# Patient Record
Sex: MALE | Race: WHITE | Employment: FULL TIME | URBAN - METROPOLITAN AREA
[De-identification: names, ages, dates, MRNs, and addresses within clinical notes are randomized per-mention and may not be internally consistent; named-entity substitution may affect disease eponyms.]

---

## 2018-02-08 ENCOUNTER — HOSPITAL ENCOUNTER (OUTPATIENT)
Dept: OTHER | Age: 43
Discharge: HOME | End: 2018-02-08
Attending: PREVENTIVE MEDICINE | Admitting: PREVENTIVE MEDICINE

## 2018-02-09 LAB
MUV IGG SER QL: 67.1
RUBELLA ANTIBODY IGG: 93
RUBEOLA (MEASLES) AB IGG: 7.4
VARICELLA-ZOSTER VIRUS AB, IGG: >4000

## 2018-02-12 LAB
NIL (NEGATIVE) SPOT CONTROL: 0
PANEL A SPOT COUNT: 0
PANEL B SPOT COUNT: 0
POSITIVE CONTROL SPOT COUNT: >20
TB CELL IMMUNE MEASURE: NEGATIVE

## 2018-07-05 ENCOUNTER — OFFICE VISIT (OUTPATIENT)
Dept: FAMILY MEDICINE CLINIC | Age: 43
End: 2018-07-05

## 2018-07-05 ENCOUNTER — HOSPITAL ENCOUNTER (OUTPATIENT)
Dept: GENERAL RADIOLOGY | Age: 43
Discharge: HOME | End: 2018-07-31
Attending: NURSE PRACTITIONER | Admitting: NURSE PRACTITIONER

## 2018-07-05 VITALS
WEIGHT: 183.4 LBS | SYSTOLIC BLOOD PRESSURE: 118 MMHG | HEIGHT: 69 IN | BODY MASS INDEX: 27.16 KG/M2 | HEART RATE: 75 BPM | DIASTOLIC BLOOD PRESSURE: 76 MMHG | OXYGEN SATURATION: 97 %

## 2018-07-05 DIAGNOSIS — S99.912A INJURY OF LEFT ANKLE, INITIAL ENCOUNTER: Primary | ICD-10-CM

## 2018-07-05 DIAGNOSIS — M25.572 ACUTE LEFT ANKLE PAIN: Primary | ICD-10-CM

## 2018-07-05 DIAGNOSIS — S99.912A INJURY OF LEFT ANKLE, INITIAL ENCOUNTER: ICD-10-CM

## 2018-07-05 PROCEDURE — 99202 OFFICE O/P NEW SF 15 MIN: CPT | Performed by: NURSE PRACTITIONER

## 2018-07-05 RX ORDER — NAPROXEN 500 MG/1
500 TABLET ORAL 2 TIMES DAILY WITH MEALS
Qty: 60 TABLET | Refills: 0 | Status: SHIPPED | OUTPATIENT
Start: 2018-07-05

## 2018-07-05 ASSESSMENT — ENCOUNTER SYMPTOMS
SHORTNESS OF BREATH: 0
CHEST TIGHTNESS: 0
COUGH: 0

## 2018-07-05 NOTE — PATIENT INSTRUCTIONS
Patient Education        Foot Pain: Care Instructions  Your Care Instructions  Foot injuries that cause pain and swelling are fairly common. Almost all sports or home repair projects can cause a misstep that ends up as foot pain. Normal wear and tear, especially as you get older, also can cause foot pain. Most minor foot injuries will heal on their own, and home treatment is usually all you need to do. If you have a severe injury, you may need tests and treatment. Follow-up care is a key part of your treatment and safety. Be sure to make and go to all appointments, and call your doctor if you are having problems. It's also a good idea to know your test results and keep a list of the medicines you take. How can you care for yourself at home? · Take pain medicines exactly as directed. ¨ If the doctor gave you a prescription medicine for pain, take it as prescribed. ¨ If you are not taking a prescription pain medicine, ask your doctor if you can take an over-the-counter medicine. · Rest and protect your foot. Take a break from any activity that may cause pain. · Put ice or a cold pack on your foot for 10 to 20 minutes at a time. Put a thin cloth between the ice and your skin. · Prop up the sore foot on a pillow when you ice it or anytime you sit or lie down during the next 3 days. Try to keep it above the level of your heart. This will help reduce swelling. · Your doctor may recommend that you wrap your foot with an elastic bandage. Keep your foot wrapped for as long as your doctor advises. · If your doctor recommends crutches, use them as directed. · Wear roomy footwear. · As soon as pain and swelling end, begin gentle exercises of your foot. Your doctor can tell you which exercises will help. When should you call for help? Call 911 anytime you think you may need emergency care.  For example, call if:    · Your foot turns pale, white, blue, or cold.    Call your doctor now or seek immediate medical care if:    · You cannot move or stand on your foot.     · Your foot looks twisted or out of its normal position.     · Your foot is not stable when you step down.     · You have signs of infection, such as:  ¨ Increased pain, swelling, warmth, or redness. ¨ Red streaks leading from the sore area. ¨ Pus draining from a place on your foot. ¨ A fever.     · Your foot is numb or tingly.    Watch closely for changes in your health, and be sure to contact your doctor if:    · You do not get better as expected.     · You have bruises from an injury that last longer than 2 weeks. Where can you learn more? Go to [email or web address] org and sign in to your Moment account. Enter R560 in the Nuritas box to learn more about \"Foot Pain: Care Instructions. \"     If you do not have an account, please click on the \"Sign Up Now\" link. Current as of: November 29, 2017  Content Version: 11.6  © 3244-8716 [employer]. Care instructions adapted under license by [employer]. If you have questions about a medical condition or this instruction, always ask your healthcare professional. [place] [address] any warranty or liability for your use of this information.

## 2018-07-05 NOTE — PROGRESS NOTES
Ronaldo Bradford  1975  38 y.o. SUBJECT ERIK:    Chief Complaint   Patient presents with    Ankle Pain     Patient states injurying left ankle yesterday while playing basketball. Patient states having pain and swelling       Pt presents today with aforementioned complaints. He states that he was playing basketball with his child yesterday when he everted his left ankle. Patient states that the pain was instantaneous on impact and has gradually worsened since. Patient states that while at work today that he noticed that his ankle had become more red and swollen throughout the day. He is able to bear weight and ambulate without difficulty, however, states the pain is not improving and makes focusing on work difficult. States he works in [employer] and notices the throbbing pain even when sitting. Ankle Pain    The incident occurred 12 to 24 hours ago. The incident occurred at home. The injury mechanism was an eversion injury. The pain is present in the left ankle. The quality of the pain is described as aching. The pain is at a severity of 3/10. The pain is mild. The pain has been constant since onset. Associated symptoms include numbness. Pertinent negatives include no inability to bear weight, loss of motion, loss of sensation, muscle weakness or tingling. He reports no foreign bodies present. The symptoms are aggravated by movement. He has tried nothing for the symptoms. History reviewed. No pertinent past medical history. No current outpatient prescriptions on file prior to visit. No current facility-administered medications on file prior to visit. Past Medical, Family, and Social History have been reviewed today. Review of Systems   Constitutional: Negative for chills, diaphoresis and fever. HENT: Negative for congestion. Respiratory: Negative for cough, chest tightness and shortness of breath. Cardiovascular: Negative for chest pain and palpitations. Musculoskeletal: Positive for gait problem and joint swelling. C/o left ankle pain, redness and swelling s/p eversion injury yesterday evening while playing basketball. Neurological: Positive for numbness. Negative for tingling. OBJECTIVE:     /76 (Site: Right Arm, Position: Sitting, Cuff Size: Large Adult)   Pulse 75   Ht 5' 9\" (1.753 m)   Wt 183 lb 6.4 oz (83.2 kg)   SpO2 97%   BMI 27.08 kg/m²     Physical Exam   Constitutional: He is oriented to person, place, and time. He appears well-developed and well-nourished. HENT:   Head: Normocephalic. Eyes: Pupils are equal, round, and reactive to light. Neck: Normal range of motion. Cardiovascular: Normal rate and normal heart sounds. Pulmonary/Chest: Breath sounds normal. He is in respiratory distress. Abdominal: Soft. Bowel sounds are normal.   Musculoskeletal: He exhibits edema and tenderness. Left ankle: He exhibits swelling. He exhibits normal range of motion, no laceration and normal pulse. Tenderness. Lateral malleolus tenderness found. Achilles tendon normal.        Feet:    Neurological: He is alert and oriented to person, place, and time. No cranial nerve deficit. Skin: Skin is warm and dry. Psychiatric: He has a normal mood and affect. ASSESSMENT AND PLAN:     1. Injury of left ankle, initial encounter  Patient educated on correct RICE techniques with rationale. Will wrap joint with ACE Wrap, provide crutches, and have patient take Naproxen PRN w/food as needed with pain. X-rays ordered to rule out out osseous injury. Patient verbalizes understanding and agrees to [address] [place] [address]. He will return if his symptoms fail to improve or get worse within the next couple of days-consider referral to ortho and or MRI if so. Return if symptoms worsen or fail to improve. Care discussed with patient. Patient educated on signs and symptoms of exacerbation and when to seek further medical attention.  Advised to call for any

## 2018-08-01 ENCOUNTER — HOSPITAL ENCOUNTER (OUTPATIENT)
Dept: GENERAL RADIOLOGY | Age: 43
Discharge: HOME | End: 2018-08-31
Attending: NURSE PRACTITIONER | Admitting: NURSE PRACTITIONER

## 2021-11-06 ENCOUNTER — OFFICE VISIT (OUTPATIENT)
Dept: FAMILY MEDICINE CLINIC | Age: 46
End: 2021-11-06
Payer: OTHER GOVERNMENT

## 2021-11-06 ENCOUNTER — HOSPITAL ENCOUNTER (OUTPATIENT)
Age: 46
Setting detail: SPECIMEN
Discharge: HOME | End: 2021-11-06
Payer: OTHER GOVERNMENT

## 2021-11-06 VITALS
TEMPERATURE: 97.7 F | WEIGHT: 234 LBS | OXYGEN SATURATION: 97 % | HEIGHT: 69 IN | HEART RATE: 100 BPM | RESPIRATION RATE: 12 BRPM | BODY MASS INDEX: 34.66 KG/M2

## 2021-11-06 DIAGNOSIS — Z20.822 SUSPECTED COVID-19 VIRUS INFECTION: Primary | ICD-10-CM

## 2021-11-06 PROCEDURE — U0005 INFEC AGEN DETEC AMPLI PROBE: HCPCS

## 2021-11-06 PROCEDURE — 99213 OFFICE O/P EST LOW 20 MIN: CPT | Performed by: PHYSICIAN ASSISTANT

## 2021-11-06 PROCEDURE — U0003 INFECTIOUS AGENT DETECTION BY NUCLEIC ACID (DNA OR RNA); SEVERE ACUTE RESPIRATORY SYNDROME CORONAVIRUS 2 (SARS-COV-2) (CORONAVIRUS DISEASE [COVID-19]), AMPLIFIED PROBE TECHNIQUE, MAKING USE OF HIGH THROUGHPUT TECHNOLOGIES AS DESCRIBED BY CMS-2020-01-R: HCPCS

## 2021-11-06 NOTE — PROGRESS NOTES
11/6/2021    HPI:  Chief complaint and history of present illness as per medical assistant/nurse documented today in the Flu/COVID-19 clinic. Patient presents to the office today with his Ragini Alexey. He has had a 2-day history of chills, cough, chest and nasal congestion, intermittent shortness of breath, chest soreness with long coughing fits, headaches, myalgias, fatigue, and loss of smell. He denies wheezing, hemoptysis, fevers, neck pain or stiffness, sore throat or dysphagia, nausea, vomiting, diarrhea. He took a rapid Covid test at home which was positive. He is here for PCR confirmation. He is vaccinated against COVID-19 with 2 doses of Pfizer. MEDICATIONS:  Prior to Visit Medications    Medication Sig Taking? Authorizing Provider   naproxen (NAPROSYN) 500 MG tablet Take 1 tablet by mouth 2 times daily (with meals)  DEBBI Rosen - CNP       No Known Allergies, History reviewed. No pertinent past medical history. PHYSICAL EXAM:  Physical Exam    Vitals:    11/06/21 1126   Pulse: 100   Resp: 12   Temp: 97.7 °F (36.5 °C)   SpO2: 97%         Constitutional:  Well developed, well nourished, pleasant and cooperative  HENT:  Normocephalic, atraumatic, bilateral external ears normal, ear canals and TMs normal, oropharynx moist with postnasal drip. No petechiae or exudate. Uvula midline and benign and airway patent. Nasal mucosa congested. Clear rhinorrhea. Eyes:  conjunctiva normal, no discharge, no scleral icterus  Cardiovascular:  Normal heart rate, normal rhythm, no murmurs, gallops or rubs  Thorax & Lungs:  Normal breath sounds, no respiratory distress, no wheezing, no rales, no rhonchi  Skin:  Warm, dry, no erythema, no rash  Neurologic:  Alert & oriented   Psychiatric:  Affect normal, mood normal    ASSESSMENT/PLAN:  1. Suspected COVID-19 virus infection  - Covid-19 Ambulatory    Covid PCR test results pending.   Presume you are positive due to positive rapid test.  Work on contact tracing. Please stay home. Consider Regeneron infusion if PCR test confirms the infection. Increase fluids and rest  Saline nasal spray as needed for nasal congestion  Warm salt gargles as needed for throat discomfort  Monitor temperature twice a day  Tylenol as needed for fevers and/or discomfort. Big deep breaths periodically throughout the day  Regular Mucinex over the counter as needed for chest congestion  If symptoms worsen -Go to the ER. Follow up with your primary care provider    FOLLOW-UP:  No follow-ups on file.        In addition to other information, the printed after visit summary provided to the patient includes:  [x] COVID-19 Self care instructions  [x] COVID-19 General patient information    [facility], JATINDER

## 2021-11-06 NOTE — PATIENT INSTRUCTIONS
Your COVID 19 test can take 1-5 days for the results to come back. We ask that you make a Mychart page and view your test results this way. You will need to Self quarantine until you know your results. Covid PCR test results pending. Presume you are positive due to positive rapid test.  Work on contact tracing. Please stay home. Consider Regeneron infusion if PCR test confirms the infection. Increase fluids and rest  Saline nasal spray as needed for nasal congestion  Warm salt gargles as needed for throat discomfort  Monitor temperature twice a day  Tylenol as needed for fevers and/or discomfort. Big deep breaths periodically throughout the day  Regular Mucinex over the counter as needed for chest congestion  If symptoms worsen -Go to the ER. Follow up with your primary care provider      To Whom it May Concern:    Isabelle Dobbins was tested for COVID-19 11/6/2021. He/she must stay home until test results are back. If test is positive, he/she must quarantine for a total of 10 days starting from day one of symptom onset. He/she must also be fever-free for 24 hours at that time, and also have improvement in symptoms. We do not recommend retesting as patients may continue to test positive for months even though no longer contagious. It is suggested you call [address] [facility] or [facility] with any questions regarding quarantine timeframe/return to work/school details. Barbi Arauz PA-C      Patient Education        Learning About Coronavirus ([phone])  What is coronavirus (COVID-19)? COVID-19 is a disease caused by a type of coronavirus. This illness was first found in December 2019. It has since spread worldwide. Coronaviruses are a large group of viruses. They cause the common cold. They also cause more serious illnesses like [place] East respiratory syndrome (MERS) and severe acute respiratory syndrome (SARS). COVID-19 is caused by a novel coronavirus.  That means it's a new type that has not been seen in people before. What are the symptoms? COVID-19 symptoms may include:  · Fever. · Cough. · Trouble breathing. · Chills or repeated shaking with chills. · Muscle and body aches. · Headache. · Sore throat. · New loss of taste or smell. · Vomiting. · Diarrhea. In severe cases, COVID-19 can cause pneumonia and make it hard to breathe without help from a machine. It can cause death. How is it diagnosed? COVID-19 is diagnosed with a viral test. This may also be called a PCR test or antigen test. It looks for evidence of the virus in your breathing passages or lungs (respiratory system). The test is most often done on a sample from the nose, throat, or lungs. It's sometimes done on a sample of saliva. One way a sample is collected is by putting a long swab into the back of your nose. How is it treated? Mild cases of COVID-19 can be treated at home. Serious cases need treatment in the hospital. Treatment may include medicines to reduce symptoms, plus breathing support such as oxygen therapy or a ventilator. Some people may be placed on their belly to help their oxygen levels. Treatments that may help people who have COVID-19 include:  Antiviral medicines. These medicines treat viral infections. Remdesivir is an example. Immune-based therapy. These medicines help the immune system fight COVID-19. Examples include monoclonal antibodies. Blood thinners. These medicines help prevent blood clots. People with severe illness are at risk for blood clots. How can you protect yourself and others? The best way to protect yourself from getting sick is to:  · Get vaccinated. · Avoid sick people. · If you are not fully vaccinated:  ? Wear a mask if you have to go to public areas. ? Avoid crowds and try to stay at least 6 feet away from other people. · Cover your mouth with a tissue when you cough or sneeze. · Wash your hands often, especially after you cough or sneeze.  Use soap and water aren't available, use an alcohol-based hand . · Don't share personal household items. These include bedding, towels, cups and glasses, and eating utensils. · [address] in the warmest water allowed for the fabric type, and dry it completely. It's okay to wash other people's laundry with yours. · Clean and disinfect your home. Use household  and disinfectant wipes or sprays. When should you call for help? Call 911 anytime you think you may need emergency care. For example, call if you have life-threatening symptoms, such as:    · You have severe trouble breathing. (You can't talk at all.)     · You have constant chest pain or pressure.     · You are severely dizzy or lightheaded.     · You are confused or can't think clearly.     · You have pale, gray, or blue-colored skin or lips.     · You pass out (lose consciousness) or are very hard to wake up. Call your doctor now or seek immediate medical care if:    · You have moderate trouble breathing. (You can't speak a full sentence.)     · You are coughing up blood (more than about 1 teaspoon).     · You have signs of low blood pressure. These include feeling lightheaded; being too weak to stand; and having cold, pale, clammy skin. Watch closely for changes in your health, and be sure to contact your doctor if:    · Your symptoms get worse.     · You are not getting better as expected.     · You have new or worse symptoms of anxiety, depression, nightmares, or flashbacks. Call before you go to the doctor's office. Follow their instructions. And wear a mask. Current as of: July 1, 2021               Content Version: 13.0  © 8383-7121 Healthwise, Incorporated. Care instructions adapted under license by [facility] ([facility]). If you have questions about a medical condition or this instruction, always ask your healthcare professional. [address] any warranty or liability for your use of this information. Patient Education        Learning About Contact Tracing  What is contact tracing? Contact tracing is a process that public health departments use to fight the spread of infectious diseases. These include COVID-19 (coronavirus), measles, and others. A health department worker reaches out to a person who has tested positive for the disease. Then the worker calls other people who may have been exposed. Depending on the disease, the contact may have happened through close contact, by eating at the same place, or through sex. The contacts are told that they have been exposed. The worker can then guide the contacts on what to do next. This may include seeing a doctor, getting tested, or staying quarantined at home for a while. Information about the person and their contacts is kept private. It's used only to help stop the spread of the disease. If you have any concerns about privacy, talk to the health department worker. Why is it done? Contact tracing helps reduce the risk of spreading disease among your friends, family, and community. A person who tests positive for a disease can expose other people to it. Each of these people in turn can expose more people in an ever-widening [place]. This raises the risk of more people getting sick. But a call from the health department can help both the person and their contacts take action so they don't spread the disease to others. Then the risk of illness and death in the community goes down. How is it done? Contact tracing usually starts with a phone call. A health department worker calls you to say that you've tested positive for a disease or that you've been in close contact with someone who has. If you test positive for a disease  The caller will ask what symptoms, if any, you have. You may be asked about any health conditions that may put you at higher risk for serious illness. You'll be urged to follow local health department safety instructions.  You may be asked to isolate. The caller will ask where you've been recently. They'll ask for the names and phone numbers of anyone you've had close contact with. These people will also be contacted. And the caller will contact any businesses or event venues you visited. Your name will not be given out unless you say it's okay. If your contacts get early warning that they may have the disease, they can follow the safety instructions sooner. They may be less likely to pass the disease to their own friends and family. If you are a close contact  If you were in close contact with someone who has the disease, the caller will urge you to follow local health department instructions. You may be asked to quarantine. Ask your doctor when it's safe to end your quarantine. The caller will give you information about the disease and urge you to watch for any symptoms. You may be advised to get treatment. Follow the caller's instructions. You may be asked about other people you've come in contact with. Where can you learn more? Go to [email or web address] and sign in to your [employer] account. Enter A132 in the [employer] box to learn more about \"Learning About Contact Tracing. \"     If you do not have an account, please click on the \"Sign Up Now\" link. Current as of: July 1, 2021               Content Version: 13.0  © 1573-9231 Healthwise, Incorporated. Care instructions adapted under license by [facility] ([facility]). If you have questions about a medical condition or this instruction, always ask your healthcare professional. [place] [address] any warranty or liability for your use of this information.

## 2021-11-06 NOTE — PROGRESS NOTES
11/6/21  Paresh Mckeon  1975    FLU/COVID-19 CLINIC EVALUATION    HPI SYMPTOMS:    Employer:    [] Fevers  [x] Chills  [x] Cough  [] Coughing up blood  [x] Chest Congestion  [x] Nasal Congestion  [] Feeling short of breath  [x] Sometimes  [] Frequently  [] All the time  [x] Chest pain  [x] Headaches  [x]Tolerable  [] Severe  [] Sore throat  [x] Muscle aches  [] Nausea  [] Vomiting  []Unable to keep fluids down  [] Diarrhea  []Severe    [x] OTHER SYMPTOMS:  Loss os smell and fatigue  Symptom Duration:   [] 1  [x] 2   [] 3   [] 4    [] 5   [] 6   [] 7   [] 8   [] 9   [] 10   [] 11   [] 12   [] 13   [] 14   [] Longer than 14 days    Symptom course:   [] Worsening     [x] Stable     [] Improving    RISK FACTORS:    [] Pregnant or possibly pregnant  [] Age over 61  [] Diabetes  [] Heart disease  [] Asthma  [] COPD/Other chronic lung diseases  [] Active Cancer  [] On Chemotherapy  [] Taking oral steroids  [] History Lymphoma/Leukemia  [] Close contact with a lab confirmed COVID-19 patient within 14 days of symptom onset  [] History of travel from affected geographical areas within 14 days of symptom onset       VITALS:  There were no vitals filed for this visit. TESTS:    POCT FLU:  [] Positive     []Negative    ASSESSMENT:    [] Flu  [] Possible COVID-19  [] Strep    PLAN:    [] Discharge home with written instructions for:  [] Flu management  [] Possible COVID-19 infection with self-quarantine and management of symptoms  [] Follow-up with primary care physician or emergency department if worsens  [] Evaluation per physician/NP/PA in clinic  [] Sent to ER       An  electronic signature was used to authenticate this note.      --Debra Nicole MA on 11/6/2021 at 11:05 AM

## 2021-11-09 LAB
SARS-COV-2: DETECTED
SOURCE: ABNORMAL

## 2021-11-11 ENCOUNTER — HOSPITAL ENCOUNTER (OUTPATIENT)
Dept: INFUSION THERAPY | Age: 46
Setting detail: INFUSION SERIES
Discharge: HOME | End: 2021-11-11
Payer: OTHER GOVERNMENT

## 2021-11-11 VITALS
SYSTOLIC BLOOD PRESSURE: 160 MMHG | RESPIRATION RATE: 20 BRPM | HEART RATE: 106 BPM | DIASTOLIC BLOOD PRESSURE: 100 MMHG | TEMPERATURE: 96.4 F | OXYGEN SATURATION: 98 %

## 2021-11-11 PROCEDURE — 2500000003 HC RX 250 WO HCPCS: Performed by: PHYSICIAN ASSISTANT

## 2021-11-11 PROCEDURE — 6360000002 HC RX W HCPCS: Performed by: PHYSICIAN ASSISTANT

## 2021-11-11 PROCEDURE — M0243 CASIRIVI AND IMDEVI INFUSION: HCPCS

## 2021-11-11 PROCEDURE — 2580000003 HC RX 258: Performed by: PHYSICIAN ASSISTANT

## 2021-11-11 RX ORDER — SODIUM CHLORIDE 0.9 % (FLUSH) 0.9 %
5-40 SYRINGE (ML) INJECTION PRN
Status: DISCONTINUED | OUTPATIENT
Start: 2021-11-11 | End: 2021-11-12 | Stop reason: HOSPADM

## 2021-11-11 RX ADMIN — IMDEVIMAB: 1332 INJECTION, SOLUTION, CONCENTRATE INTRAVENOUS at 14:23

## 2021-11-11 NOTE — PROGRESS NOTES
IV discontinued. DSD applied. Pt tolerated well. Discharged instructions given to pt, pt voiced understanding. Pt discharged via ambulatory by self with staff to exit.